# Patient Record
Sex: FEMALE | Race: WHITE | NOT HISPANIC OR LATINO | Employment: FULL TIME | URBAN - NONMETROPOLITAN AREA
[De-identification: names, ages, dates, MRNs, and addresses within clinical notes are randomized per-mention and may not be internally consistent; named-entity substitution may affect disease eponyms.]

---

## 2023-06-30 ENCOUNTER — OFFICE VISIT (OUTPATIENT)
Dept: URGENT CARE | Facility: CLINIC | Age: 33
End: 2023-06-30
Payer: COMMERCIAL

## 2023-06-30 VITALS
RESPIRATION RATE: 16 BRPM | SYSTOLIC BLOOD PRESSURE: 128 MMHG | BODY MASS INDEX: 34.91 KG/M2 | HEIGHT: 63 IN | TEMPERATURE: 99.9 F | WEIGHT: 197 LBS | DIASTOLIC BLOOD PRESSURE: 88 MMHG | OXYGEN SATURATION: 98 % | HEART RATE: 104 BPM

## 2023-06-30 DIAGNOSIS — R50.9 FEVER, UNSPECIFIED FEVER CAUSE: ICD-10-CM

## 2023-06-30 DIAGNOSIS — J02.0 STREP PHARYNGITIS: ICD-10-CM

## 2023-06-30 DIAGNOSIS — R52 BODY ACHES: ICD-10-CM

## 2023-06-30 PROBLEM — F41.1 GENERALIZED ANXIETY DISORDER: Status: ACTIVE | Noted: 2021-05-04

## 2023-06-30 PROBLEM — G47.00 INSOMNIA: Status: ACTIVE | Noted: 2021-09-01

## 2023-06-30 PROBLEM — F34.1 PERSISTENT DEPRESSIVE DISORDER: Status: ACTIVE | Noted: 2021-07-07

## 2023-06-30 PROBLEM — F32.0 CURRENT MILD EPISODE OF MAJOR DEPRESSIVE DISORDER (HCC): Status: ACTIVE | Noted: 2023-06-30

## 2023-06-30 PROBLEM — E66.01 OBESITY, CLASS III, BMI 40-49.9 (MORBID OBESITY) (HCC): Status: ACTIVE | Noted: 2021-04-06

## 2023-06-30 LAB — S PYO DNA SPEC NAA+PROBE: DETECTED

## 2023-06-30 PROCEDURE — 3079F DIAST BP 80-89 MM HG: CPT

## 2023-06-30 PROCEDURE — 99203 OFFICE O/P NEW LOW 30 MIN: CPT

## 2023-06-30 PROCEDURE — 3074F SYST BP LT 130 MM HG: CPT

## 2023-06-30 PROCEDURE — 87651 STREP A DNA AMP PROBE: CPT

## 2023-06-30 RX ORDER — HYDROXYZINE HYDROCHLORIDE 10 MG/1
TABLET, FILM COATED ORAL
COMMUNITY
Start: 2023-06-01

## 2023-06-30 RX ORDER — FLUOXETINE HYDROCHLORIDE 20 MG/1
CAPSULE ORAL
COMMUNITY
Start: 2023-06-02

## 2023-06-30 RX ORDER — OFLOXACIN 3 MG/ML
SOLUTION/ DROPS OPHTHALMIC
COMMUNITY
Start: 2023-05-28 | End: 2023-06-30

## 2023-06-30 RX ORDER — TRAZODONE HYDROCHLORIDE 50 MG/1
TABLET ORAL
COMMUNITY
Start: 2023-06-01

## 2023-06-30 RX ORDER — BUPROPION HYDROCHLORIDE 150 MG/1
TABLET ORAL
COMMUNITY
Start: 2023-06-02

## 2023-06-30 RX ORDER — CETIRIZINE HYDROCHLORIDE 10 MG/1
10 CAPSULE, LIQUID FILLED ORAL DAILY
COMMUNITY

## 2023-06-30 RX ORDER — COPPER 313.4 MG/1
1 INTRAUTERINE DEVICE INTRAUTERINE
COMMUNITY

## 2023-06-30 RX ORDER — AMOXICILLIN 500 MG/1
500 CAPSULE ORAL 2 TIMES DAILY
Qty: 20 CAPSULE | Refills: 0 | Status: SHIPPED | OUTPATIENT
Start: 2023-06-30 | End: 2023-07-10

## 2023-06-30 RX ORDER — KETOROLAC TROMETHAMINE 30 MG/ML
30 INJECTION, SOLUTION INTRAMUSCULAR; INTRAVENOUS ONCE
Status: COMPLETED | OUTPATIENT
Start: 2023-06-30 | End: 2023-06-30

## 2023-06-30 RX ADMIN — KETOROLAC TROMETHAMINE 30 MG: 30 INJECTION, SOLUTION INTRAMUSCULAR; INTRAVENOUS at 11:18

## 2023-06-30 NOTE — PROGRESS NOTES
"Subjective:   Thao Perez is a 32 y.o. female who presents for Pharyngitis (Difficulty swallowing, painful x Monday )      HPI:    Patient presents to urgent care with concerns of sore throat and body aches x 5 days.  Denies rhinorrhea, nasal congestion, cough.  Reports persistent fever of  F, chills  No nausea, vomiting, diarrhea  Denies abdominal pain  States eating solid food is painful  Tolerating fluids  Mild improvement with rest, Advil.  Has not taken anything for body aches and fever today  Denies rash      ROS As above in HPI    Medications:    Current Outpatient Medications on File Prior to Visit   Medication Sig Dispense Refill    buPROPion (WELLBUTRIN XL) 150 MG XL tablet       Cetirizine HCl (ZYRTEC ALLERGY) 10 MG Cap Take 10 mg by mouth every day.      Paragard Intrauterine Copper IUD 1 Intra Uterine Device by Intrauterine route.      FLUoxetine (PROZAC) 20 MG Cap       hydrOXYzine HCl (ATARAX) 10 MG Tab       traZODone (DESYREL) 50 MG Tab        No current facility-administered medications on file prior to visit.        Allergies:   Bee pollen and Pollen extract    Problem List:   Patient Active Problem List   Diagnosis    Conjunctivitis, viral    Current mild episode of major depressive disorder (HCC)    Generalized anxiety disorder    Insomnia    Obesity, Class III, BMI 40-49.9 (morbid obesity) (HCC)    Persistent depressive disorder    Plantar wart        Surgical History:  No past surgical history on file.    Past Social Hx:   Social History     Tobacco Use    Smoking status: Never    Smokeless tobacco: Never   Substance Use Topics    Alcohol use: Yes    Drug use: Yes     Types: Marijuana          Problem list, medications, and allergies reviewed by myself today in Epic.     Objective:     /88 (BP Location: Right arm, Patient Position: Sitting, BP Cuff Size: Adult long)   Pulse (!) 104   Temp 37.7 °C (99.9 °F) (Temporal)   Resp 16   Ht 1.6 m (5' 3\")   Wt 89.4 kg (197 lb)   " SpO2 98%   BMI 34.90 kg/m²     Physical Exam  Vitals and nursing note reviewed.   Constitutional:       Appearance: Normal appearance.   HENT:      Head: Normocephalic and atraumatic.      Right Ear: Ear canal normal. Tympanic membrane is injected.      Left Ear: Tympanic membrane and ear canal normal.      Nose: Nose normal.      Mouth/Throat:      Mouth: Mucous membranes are moist.      Pharynx: Uvula midline. Pharyngeal swelling and posterior oropharyngeal erythema present.      Tonsils: Tonsillar exudate present. 3+ on the right. 3+ on the left.   Cardiovascular:      Rate and Rhythm: Regular rhythm. Tachycardia present.      Heart sounds: Normal heart sounds. No murmur heard.     No friction rub. No gallop.   Pulmonary:      Effort: Pulmonary effort is normal.      Breath sounds: Normal breath sounds and air entry.   Abdominal:      General: Bowel sounds are normal. There is no distension.      Palpations: Abdomen is soft.      Tenderness: There is no abdominal tenderness. There is no right CVA tenderness, left CVA tenderness, guarding or rebound.   Lymphadenopathy:      Cervical: Cervical adenopathy present.   Skin:     General: Skin is warm and dry.      Capillary Refill: Capillary refill takes less than 2 seconds.      Findings: No rash.   Neurological:      Mental Status: She is alert and oriented to person, place, and time.         Assessment/Plan:       Results for orders placed or performed in visit on 06/30/23   POCT CEPHEID GROUP A STREP - PCR   Result Value Ref Range    POC Group A Strep, PCR Detected (A) Not Detected, Invalid       Diagnosis and associated orders:   1. Strep pharyngitis  - amoxicillin (AMOXIL) 500 MG Cap; Take 1 Capsule by mouth 2 times a day for 10 days.  Dispense: 20 Capsule; Refill: 0    2. Fever, unspecified fever cause  - POCT CEPHEID GROUP A STREP - PCR    3. Body aches  - ketorolac (Toradol) injection 30 mg         Comments/MDM:       Poc strep is positive  Hygiene measures  reviewed to prevent coinfection  Supportive measures encouraged: Rest, increased oral hydration, Tylenol for remainder of today as needed per package instructions, lozenges, ice pop, diet as tolerated.  Follow up with PCP advised       Please note that this dictation was created using voice recognition software. I have made a reasonable attempt to correct obvious errors, but I expect that there are errors of grammar and possibly content that I did not discover before finalizing the note.    This note was electronically signed by Janet Olmstead DNP

## 2023-07-15 ENCOUNTER — OFFICE VISIT (OUTPATIENT)
Dept: URGENT CARE | Facility: CLINIC | Age: 33
End: 2023-07-15
Payer: COMMERCIAL

## 2023-07-15 VITALS
OXYGEN SATURATION: 97 % | SYSTOLIC BLOOD PRESSURE: 110 MMHG | DIASTOLIC BLOOD PRESSURE: 78 MMHG | WEIGHT: 197 LBS | RESPIRATION RATE: 16 BRPM | TEMPERATURE: 98.6 F | HEIGHT: 63 IN | BODY MASS INDEX: 34.91 KG/M2 | HEART RATE: 118 BPM

## 2023-07-15 DIAGNOSIS — R00.0 TACHYCARDIA: ICD-10-CM

## 2023-07-15 DIAGNOSIS — R31.9 URINARY TRACT INFECTION WITH HEMATURIA, SITE UNSPECIFIED: ICD-10-CM

## 2023-07-15 DIAGNOSIS — N39.0 URINARY TRACT INFECTION WITH HEMATURIA, SITE UNSPECIFIED: ICD-10-CM

## 2023-07-15 DIAGNOSIS — R10.9 FLANK PAIN: ICD-10-CM

## 2023-07-15 LAB
APPEARANCE UR: NORMAL
BILIRUB UR STRIP-MCNC: NORMAL MG/DL
COLOR UR AUTO: NORMAL
GLUCOSE UR STRIP.AUTO-MCNC: NEGATIVE MG/DL
KETONES UR STRIP.AUTO-MCNC: NORMAL MG/DL
LEUKOCYTE ESTERASE UR QL STRIP.AUTO: NORMAL
NITRITE UR QL STRIP.AUTO: NEGATIVE
PH UR STRIP.AUTO: 5.5 [PH] (ref 5–8)
POCT INT CON NEG: NEGATIVE
POCT INT CON POS: POSITIVE
POCT URINE PREGNANCY TEST: NEGATIVE
PROT UR QL STRIP: NORMAL MG/DL
RBC UR QL AUTO: NORMAL
SP GR UR STRIP.AUTO: 1.02
UROBILINOGEN UR STRIP-MCNC: 2 MG/DL

## 2023-07-15 PROCEDURE — 99214 OFFICE O/P EST MOD 30 MIN: CPT | Performed by: NURSE PRACTITIONER

## 2023-07-15 PROCEDURE — 81002 URINALYSIS NONAUTO W/O SCOPE: CPT | Performed by: NURSE PRACTITIONER

## 2023-07-15 PROCEDURE — 81025 URINE PREGNANCY TEST: CPT | Performed by: NURSE PRACTITIONER

## 2023-07-15 PROCEDURE — 3078F DIAST BP <80 MM HG: CPT | Performed by: NURSE PRACTITIONER

## 2023-07-15 PROCEDURE — 3074F SYST BP LT 130 MM HG: CPT | Performed by: NURSE PRACTITIONER

## 2023-07-15 ASSESSMENT — ENCOUNTER SYMPTOMS
COUGH: 0
SORE THROAT: 0
BACK PAIN: 1
NAUSEA: 1
FEVER: 1
DIARRHEA: 1
CHILLS: 1
VOMITING: 1

## 2023-07-15 NOTE — PROGRESS NOTES
Subjective:     Thao Perez is a 32 y.o. female who presents for UTI (Fever, chills, vomiting, kidney infection concern, frequency, urgency x 3 days )      Started July 5th UTI symptoms. No vaginal symptoms. LMP on the 1st. Vomitted the last 2 mornings. Decreased appetite. BM normal the last few days, diarrhea today.           UTI  This is a new problem. The current episode started in the past 7 days. Associated symptoms include chills, a fever, nausea and vomiting. Pertinent negatives include no coughing or sore throat.       History reviewed. No pertinent past medical history.    History reviewed. No pertinent surgical history.    Social History     Socioeconomic History    Marital status: Single     Spouse name: Not on file    Number of children: Not on file    Years of education: Not on file    Highest education level: Not on file   Occupational History    Not on file   Tobacco Use    Smoking status: Never    Smokeless tobacco: Never   Substance and Sexual Activity    Alcohol use: Yes    Drug use: Yes     Types: Marijuana    Sexual activity: Not on file   Other Topics Concern    Not on file   Social History Narrative    Not on file     Social Determinants of Health     Financial Resource Strain: Not on file   Food Insecurity: Not on file   Transportation Needs: Not on file   Physical Activity: Not on file   Stress: Not on file   Social Connections: Not on file   Intimate Partner Violence: Not on file   Housing Stability: Not on file        History reviewed. No pertinent family history.     Allergies   Allergen Reactions    Bee Pollen     Pollen Extract        Review of Systems   Constitutional:  Positive for chills, fever and malaise/fatigue.   HENT:  Negative for sore throat.    Respiratory:  Negative for cough.    Gastrointestinal:  Positive for diarrhea, nausea and vomiting.   Genitourinary:  Positive for dysuria, frequency and urgency.   Musculoskeletal:  Positive for back pain.   All other systems  "reviewed and are negative.       Objective:   /78 (BP Location: Left arm, Patient Position: Sitting, BP Cuff Size: Adult)   Pulse (!) 118   Temp 37 °C (98.6 °F) (Temporal)   Resp 16   Ht 1.6 m (5' 3\")   Wt 89.4 kg (197 lb)   SpO2 97%   BMI 34.90 kg/m²     Physical Exam  Cardiovascular:      Rate and Rhythm: Tachycardia present.   Abdominal:      Tenderness: There is abdominal tenderness in the suprapubic area. There is right CVA tenderness and left CVA tenderness.   Neurological:      Mental Status: She is alert.   Psychiatric:         Mood and Affect: Mood normal.         Behavior: Behavior normal.         Assessment/Plan:   1. Urinary tract infection with hematuria, site unspecified  - POCT Urinalysis  - POCT Pregnancy    2. Flank pain    3. Tachycardia    Results for orders placed or performed in visit on 07/15/23   POCT Urinalysis   Result Value Ref Range    POC Color ORANGE Negative    POC Appearance CLOUDY Negative    POC Glucose NEGATIVE Negative mg/dL    POC Bilirubin SMALL Negative mg/dL    POC Ketones 40 MG/DL Negative mg/dL    POC Specific Gravity 1.020 <1.005 - >1.030    POC Blood SMALL Negative    POC Urine PH 5.5 5.0 - 8.0    POC Protein 100 MG/DL Negative mg/dL    POC Urobiligen 2.0 Negative (0.2) mg/dL    POC Nitrites NEGATIVE Negative    POC Leukocyte Esterase MODERATE Negative   POCT Pregnancy   Result Value Ref Range    POC Urine Pregnancy Test Negative     Internal Control Positive Positive     Internal Control Negative Negative      -Had been on amoxicillin for strep throat. States she does have a hx of elevated HR in clinic. Review of previous visit indicate  at last visit. States she has been monitoring her HR at home, and has also been uncharacteristically elevated today at home.     CVA tenderness, greater on the right, with tachycardia. Vomiting with subjective fever, has been taking ibuprofen. No history of pyelonephritis. Referred to the ED for further evaluation of " potentially complicated UTI, pyelonephritis.    Differential diagnosis, natural history, supportive care, and indications for immediate follow-up discussed.